# Patient Record
Sex: FEMALE | Race: WHITE | NOT HISPANIC OR LATINO | Employment: OTHER | URBAN - METROPOLITAN AREA
[De-identification: names, ages, dates, MRNs, and addresses within clinical notes are randomized per-mention and may not be internally consistent; named-entity substitution may affect disease eponyms.]

---

## 2023-05-25 ENCOUNTER — MEDICAL CORRESPONDENCE (OUTPATIENT)
Dept: HEALTH INFORMATION MANAGEMENT | Facility: CLINIC | Age: 68
End: 2023-05-25

## 2023-06-07 ENCOUNTER — LAB REQUISITION (OUTPATIENT)
Dept: LAB | Facility: CLINIC | Age: 68
End: 2023-06-07
Payer: COMMERCIAL

## 2023-06-07 DIAGNOSIS — M25.462 EFFUSION, LEFT KNEE: ICD-10-CM

## 2023-06-07 LAB
% LINING CELLS, BODY FLUID: 5 %
APPEARANCE FLD: ABNORMAL
CELL COUNT BODY FLUID SOURCE: ABNORMAL
COLOR FLD: ABNORMAL
CRYSTALS SNV MICRO: NORMAL
LYMPHOCYTES NFR FLD MANUAL: 28 %
MONOS+MACROS NFR FLD MANUAL: 23 %
NEUTS BAND NFR FLD MANUAL: 44 %
WBC # FLD AUTO: 2161 /UL

## 2023-06-07 PROCEDURE — 89051 BODY FLUID CELL COUNT: CPT | Mod: ORL | Performed by: ORTHOPAEDIC SURGERY

## 2023-06-07 PROCEDURE — 89060 EXAM SYNOVIAL FLUID CRYSTALS: CPT | Mod: ORL | Performed by: ORTHOPAEDIC SURGERY

## 2023-06-07 PROCEDURE — 87075 CULTR BACTERIA EXCEPT BLOOD: CPT | Mod: ORL | Performed by: ORTHOPAEDIC SURGERY

## 2023-06-07 PROCEDURE — 87070 CULTURE OTHR SPECIMN AEROBIC: CPT | Mod: ORL | Performed by: ORTHOPAEDIC SURGERY

## 2023-06-12 LAB — BACTERIA SNV CULT: NO GROWTH

## 2023-06-21 LAB — BACTERIA SNV CULT: NORMAL

## 2023-07-13 NOTE — BRIEF OP NOTE
Pre-op Total Joint Patient Screening    1. Do you have a ride available to come to the hospital the day after your surgery by 8am with anticipated discharge of 11am? STAYING 3 NIGHTS PER PT   2. What is the name of this person?   3. Do you have a  set up after surgery? TCU  4. Will your  be the same person that gives you a ride home after surgery?   5. Have you received the Joint Replacement Guidebook?YES   6. Do you have any questions about your guidebook?   7. Have you signed up for Epic Care Companion?   8. Have you signed up for MY Chart access?

## 2023-07-14 RX ORDER — BRIMONIDINE TARTRATE 0.25 MG/ML
1 SOLUTION/ DROPS OPHTHALMIC EVERY MORNING
COMMUNITY

## 2023-07-14 RX ORDER — FAMOTIDINE 10 MG
10 TABLET ORAL DAILY PRN
COMMUNITY

## 2023-07-14 RX ORDER — ZOLPIDEM TARTRATE 5 MG/1
5 TABLET ORAL
Status: ON HOLD | COMMUNITY
End: 2023-07-17

## 2023-07-14 RX ORDER — ACETAMINOPHEN 500 MG
500-1000 TABLET ORAL 2 TIMES DAILY PRN
Status: ON HOLD | COMMUNITY
End: 2023-07-19

## 2023-07-14 RX ORDER — DICLOFENAC SODIUM 75 MG/1
75 TABLET, DELAYED RELEASE ORAL 2 TIMES DAILY
COMMUNITY

## 2023-07-14 NOTE — PROGRESS NOTES
PTA medications updated by Medication Scribe prior to surgery via phone call with patient (last doses completed by Nurse)     Medication history sources: Patient, H&P and Patient's home med list  In the past week, patient estimated taking medication this percent of the time: Greater than 90%      Significant changes made to the medication list:  None      Additional medication history information:   None    Medication reconciliation completed by provider prior to medication history? No    Time spent in this activity: 40 MINUTES    The information provided in this note is only as accurate as the sources available at the time of update(s)      Prior to Admission medications    Medication Sig Last Dose Taking? Auth Provider Long Term End Date   acetaminophen (TYLENOL) 500 MG tablet Take 500-1,000 mg by mouth 2 times daily as needed for mild pain  at PRN Yes Reported, Patient     Brimonidine Tartrate (LUMIFY) 0.025 % SOLN Place 1 drop into both eyes every morning  at AM Yes Reported, Patient     calcium carbonate (OS-HORTENSIA) 1500 (600 Ca) MG tablet Take 600 mg by mouth every morning  at AM Yes Reported, Patient     diclofenac (VOLTAREN) 75 MG EC tablet Take 75 mg by mouth 2 times daily  Yes Reported, Patient Yes    famotidine (PEPCID) 10 MG tablet Take 10 mg by mouth daily as needed  at PM Yes Reported, Patient     fluticasone (FLONASE) 50 MCG/ACT nasal spray Spray 2 sprays into both nostrils daily  at PM Yes Reported, Patient     loratadine-pseudoePHEDrine (CLARITIN-D 12-HOUR) 5-120 MG 12 hr tablet Take 1 tablet by mouth every morning  at AM Yes Reported, Patient     simvastatin (ZOCOR) 10 MG tablet Take 10 mg by mouth At Bedtime  Yes Reported, Patient Yes    Vitamin D3 (CHOLECALCIFEROL) 25 mcg (1000 units) tablet Take 2 tablets by mouth daily  at AM Yes Reported, Patient     zolpidem (AMBIEN) 5 MG tablet Take 5 mg by mouth nightly as needed for sleep  at PRN Yes Reported, Patient

## 2023-07-17 ENCOUNTER — ANESTHESIA EVENT (OUTPATIENT)
Dept: SURGERY | Facility: CLINIC | Age: 68
DRG: 468 | End: 2023-07-17
Payer: COMMERCIAL

## 2023-07-17 ENCOUNTER — APPOINTMENT (OUTPATIENT)
Dept: GENERAL RADIOLOGY | Facility: CLINIC | Age: 68
DRG: 468 | End: 2023-07-17
Attending: ORTHOPAEDIC SURGERY
Payer: COMMERCIAL

## 2023-07-17 ENCOUNTER — ANESTHESIA (OUTPATIENT)
Dept: SURGERY | Facility: CLINIC | Age: 68
DRG: 468 | End: 2023-07-17
Payer: COMMERCIAL

## 2023-07-17 ENCOUNTER — HOSPITAL ENCOUNTER (INPATIENT)
Facility: CLINIC | Age: 68
LOS: 2 days | Discharge: NURSING, REHAB OR LONG-TERM CARE | DRG: 468 | End: 2023-07-19
Attending: ORTHOPAEDIC SURGERY | Admitting: ORTHOPAEDIC SURGERY
Payer: COMMERCIAL

## 2023-07-17 ENCOUNTER — APPOINTMENT (OUTPATIENT)
Dept: PHYSICAL THERAPY | Facility: CLINIC | Age: 68
DRG: 468 | End: 2023-07-17
Attending: ORTHOPAEDIC SURGERY
Payer: COMMERCIAL

## 2023-07-17 DIAGNOSIS — Z96.652 STATUS POST REVISION OF TOTAL KNEE, LEFT: Primary | ICD-10-CM

## 2023-07-17 DIAGNOSIS — G47.00 INSOMNIA, UNSPECIFIED TYPE: ICD-10-CM

## 2023-07-17 DIAGNOSIS — Z96.652 S/P REVISION OF TOTAL KNEE, LEFT: ICD-10-CM

## 2023-07-17 LAB
ABO/RH(D): NORMAL
ANION GAP SERPL CALCULATED.3IONS-SCNC: 13 MMOL/L (ref 7–15)
ANTIBODY SCREEN: NEGATIVE
BUN SERPL-MCNC: 14.5 MG/DL (ref 8–23)
CALCIUM SERPL-MCNC: 8.7 MG/DL (ref 8.8–10.2)
CHLORIDE SERPL-SCNC: 100 MMOL/L (ref 98–107)
CREAT SERPL-MCNC: 0.68 MG/DL (ref 0.51–0.95)
DEPRECATED HCO3 PLAS-SCNC: 23 MMOL/L (ref 22–29)
GFR SERPL CREATININE-BSD FRML MDRD: >90 ML/MIN/1.73M2
GLUCOSE SERPL-MCNC: 149 MG/DL (ref 70–99)
GRAM STAIN RESULT: NORMAL
GRAM STAIN RESULT: NORMAL
POTASSIUM SERPL-SCNC: 3.5 MMOL/L (ref 3.4–5.3)
SODIUM SERPL-SCNC: 136 MMOL/L (ref 136–145)
SPECIMEN EXPIRATION DATE: NORMAL

## 2023-07-17 PROCEDURE — 250N000011 HC RX IP 250 OP 636: Performed by: ORTHOPAEDIC SURGERY

## 2023-07-17 PROCEDURE — 272N000001 HC OR GENERAL SUPPLY STERILE: Performed by: ORTHOPAEDIC SURGERY

## 2023-07-17 PROCEDURE — 87077 CULTURE AEROBIC IDENTIFY: CPT | Performed by: ORTHOPAEDIC SURGERY

## 2023-07-17 PROCEDURE — 99207 PR NO BILLABLE SERVICE THIS VISIT: CPT | Performed by: PHYSICIAN ASSISTANT

## 2023-07-17 PROCEDURE — 258N000003 HC RX IP 258 OP 636: Performed by: ANESTHESIOLOGY

## 2023-07-17 PROCEDURE — 258N000003 HC RX IP 258 OP 636: Performed by: ORTHOPAEDIC SURGERY

## 2023-07-17 PROCEDURE — 87075 CULTR BACTERIA EXCEPT BLOOD: CPT | Performed by: ORTHOPAEDIC SURGERY

## 2023-07-17 PROCEDURE — 250N000013 HC RX MED GY IP 250 OP 250 PS 637: Performed by: ORTHOPAEDIC SURGERY

## 2023-07-17 PROCEDURE — 0SPD0JZ REMOVAL OF SYNTHETIC SUBSTITUTE FROM LEFT KNEE JOINT, OPEN APPROACH: ICD-10-PCS | Performed by: ORTHOPAEDIC SURGERY

## 2023-07-17 PROCEDURE — 250N000025 HC SEVOFLURANE, PER MIN: Performed by: ORTHOPAEDIC SURGERY

## 2023-07-17 PROCEDURE — 97530 THERAPEUTIC ACTIVITIES: CPT | Mod: GP

## 2023-07-17 PROCEDURE — 370N000017 HC ANESTHESIA TECHNICAL FEE, PER MIN: Performed by: ORTHOPAEDIC SURGERY

## 2023-07-17 PROCEDURE — 999N000063 XR KNEE PORT LEFT 1/2 VIEWS: Mod: LT

## 2023-07-17 PROCEDURE — 97110 THERAPEUTIC EXERCISES: CPT | Mod: GP

## 2023-07-17 PROCEDURE — 250N000011 HC RX IP 250 OP 636

## 2023-07-17 PROCEDURE — 120N000001 HC R&B MED SURG/OB

## 2023-07-17 PROCEDURE — 97161 PT EVAL LOW COMPLEX 20 MIN: CPT | Mod: GP

## 2023-07-17 PROCEDURE — 80048 BASIC METABOLIC PNL TOTAL CA: CPT | Performed by: ORTHOPAEDIC SURGERY

## 2023-07-17 PROCEDURE — 999N000141 HC STATISTIC PRE-PROCEDURE NURSING ASSESSMENT: Performed by: ORTHOPAEDIC SURGERY

## 2023-07-17 PROCEDURE — C1713 ANCHOR/SCREW BN/BN,TIS/BN: HCPCS | Performed by: ORTHOPAEDIC SURGERY

## 2023-07-17 PROCEDURE — 360N000078 HC SURGERY LEVEL 5, PER MIN: Performed by: ORTHOPAEDIC SURGERY

## 2023-07-17 PROCEDURE — 710N000009 HC RECOVERY PHASE 1, LEVEL 1, PER MIN: Performed by: ORTHOPAEDIC SURGERY

## 2023-07-17 PROCEDURE — C1776 JOINT DEVICE (IMPLANTABLE): HCPCS | Performed by: ORTHOPAEDIC SURGERY

## 2023-07-17 PROCEDURE — 250N000011 HC RX IP 250 OP 636: Mod: JZ | Performed by: ORTHOPAEDIC SURGERY

## 2023-07-17 PROCEDURE — 250N000009 HC RX 250: Performed by: ANESTHESIOLOGY

## 2023-07-17 PROCEDURE — 87205 SMEAR GRAM STAIN: CPT | Performed by: ORTHOPAEDIC SURGERY

## 2023-07-17 PROCEDURE — 0SRD0J9 REPLACEMENT OF LEFT KNEE JOINT WITH SYNTHETIC SUBSTITUTE, CEMENTED, OPEN APPROACH: ICD-10-PCS | Performed by: ORTHOPAEDIC SURGERY

## 2023-07-17 PROCEDURE — 36415 COLL VENOUS BLD VENIPUNCTURE: CPT | Performed by: ORTHOPAEDIC SURGERY

## 2023-07-17 PROCEDURE — 250N000011 HC RX IP 250 OP 636: Mod: JZ | Performed by: ANESTHESIOLOGY

## 2023-07-17 PROCEDURE — 250N000011 HC RX IP 250 OP 636: Performed by: ANESTHESIOLOGY

## 2023-07-17 PROCEDURE — 86901 BLOOD TYPING SEROLOGIC RH(D): CPT | Performed by: ORTHOPAEDIC SURGERY

## 2023-07-17 DEVICE — ATTUNE KNEE SYSTEM REVISION CEMENTED STEM CEMENTED 14X80MM
Type: IMPLANTABLE DEVICE | Site: KNEE | Status: FUNCTIONAL
Brand: ATTUNE

## 2023-07-17 DEVICE — ATTUNE KNEE SYSTEM REVISION ROTATING PLATFORM TIBIAL BASE CEMENTED SIZE 4
Type: IMPLANTABLE DEVICE | Site: KNEE | Status: FUNCTIONAL
Brand: ATTUNE

## 2023-07-17 DEVICE — IMP BONE CEMENT SIMPLEX W/GENTAMICIN 6195-1-001: Type: IMPLANTABLE DEVICE | Site: KNEE | Status: FUNCTIONAL

## 2023-07-17 RX ORDER — OXYCODONE HYDROCHLORIDE 5 MG/1
10 TABLET ORAL EVERY 4 HOURS PRN
Status: DISCONTINUED | OUTPATIENT
Start: 2023-07-17 | End: 2023-07-19 | Stop reason: HOSPADM

## 2023-07-17 RX ORDER — DEXAMETHASONE SODIUM PHOSPHATE 4 MG/ML
INJECTION, SOLUTION INTRA-ARTICULAR; INTRALESIONAL; INTRAMUSCULAR; INTRAVENOUS; SOFT TISSUE PRN
Status: DISCONTINUED | OUTPATIENT
Start: 2023-07-17 | End: 2023-07-17

## 2023-07-17 RX ORDER — EPHEDRINE SULFATE 50 MG/ML
INJECTION, SOLUTION INTRAMUSCULAR; INTRAVENOUS; SUBCUTANEOUS PRN
Status: DISCONTINUED | OUTPATIENT
Start: 2023-07-17 | End: 2023-07-17

## 2023-07-17 RX ORDER — ZOLPIDEM TARTRATE 5 MG/1
5 TABLET ORAL
Status: DISCONTINUED | OUTPATIENT
Start: 2023-07-18 | End: 2023-07-19 | Stop reason: HOSPADM

## 2023-07-17 RX ORDER — CEFAZOLIN SODIUM 2 G/100ML
2 INJECTION, SOLUTION INTRAVENOUS EVERY 8 HOURS
Status: COMPLETED | OUTPATIENT
Start: 2023-07-17 | End: 2023-07-18

## 2023-07-17 RX ORDER — PROPOFOL 10 MG/ML
INJECTION, EMULSION INTRAVENOUS CONTINUOUS PRN
Status: DISCONTINUED | OUTPATIENT
Start: 2023-07-17 | End: 2023-07-17

## 2023-07-17 RX ORDER — VANCOMYCIN HYDROCHLORIDE 1 G/20ML
INJECTION, POWDER, LYOPHILIZED, FOR SOLUTION INTRAVENOUS PRN
Status: DISCONTINUED | OUTPATIENT
Start: 2023-07-17 | End: 2023-07-17 | Stop reason: HOSPADM

## 2023-07-17 RX ORDER — FENTANYL CITRATE 50 UG/ML
25 INJECTION, SOLUTION INTRAMUSCULAR; INTRAVENOUS EVERY 5 MIN PRN
Status: DISCONTINUED | OUTPATIENT
Start: 2023-07-17 | End: 2023-07-17 | Stop reason: HOSPADM

## 2023-07-17 RX ORDER — VASOPRESSIN IN 0.9 % NACL 2 UNIT/2ML
SYRINGE (ML) INTRAVENOUS PRN
Status: DISCONTINUED | OUTPATIENT
Start: 2023-07-17 | End: 2023-07-17

## 2023-07-17 RX ORDER — NALOXONE HYDROCHLORIDE 0.4 MG/ML
0.2 INJECTION, SOLUTION INTRAMUSCULAR; INTRAVENOUS; SUBCUTANEOUS
Status: DISCONTINUED | OUTPATIENT
Start: 2023-07-17 | End: 2023-07-19 | Stop reason: HOSPADM

## 2023-07-17 RX ORDER — PROCHLORPERAZINE MALEATE 5 MG
5 TABLET ORAL EVERY 6 HOURS PRN
Status: DISCONTINUED | OUTPATIENT
Start: 2023-07-17 | End: 2023-07-19 | Stop reason: HOSPADM

## 2023-07-17 RX ORDER — FENTANYL CITRATE 50 UG/ML
50 INJECTION, SOLUTION INTRAMUSCULAR; INTRAVENOUS EVERY 5 MIN PRN
Status: DISCONTINUED | OUTPATIENT
Start: 2023-07-17 | End: 2023-07-17 | Stop reason: HOSPADM

## 2023-07-17 RX ORDER — SODIUM CHLORIDE, SODIUM LACTATE, POTASSIUM CHLORIDE, CALCIUM CHLORIDE 600; 310; 30; 20 MG/100ML; MG/100ML; MG/100ML; MG/100ML
INJECTION, SOLUTION INTRAVENOUS CONTINUOUS
Status: DISCONTINUED | OUTPATIENT
Start: 2023-07-17 | End: 2023-07-17

## 2023-07-17 RX ORDER — CEFAZOLIN SODIUM/WATER 2 G/20 ML
2 SYRINGE (ML) INTRAVENOUS
Status: COMPLETED | OUTPATIENT
Start: 2023-07-17 | End: 2023-07-17

## 2023-07-17 RX ORDER — LIDOCAINE HYDROCHLORIDE 20 MG/ML
INJECTION, SOLUTION INFILTRATION; PERINEURAL PRN
Status: DISCONTINUED | OUTPATIENT
Start: 2023-07-17 | End: 2023-07-17

## 2023-07-17 RX ORDER — LIDOCAINE 40 MG/G
CREAM TOPICAL
Status: DISCONTINUED | OUTPATIENT
Start: 2023-07-17 | End: 2023-07-19 | Stop reason: HOSPADM

## 2023-07-17 RX ORDER — POLYETHYLENE GLYCOL 3350 17 G/17G
17 POWDER, FOR SOLUTION ORAL DAILY
Status: DISCONTINUED | OUTPATIENT
Start: 2023-07-18 | End: 2023-07-19 | Stop reason: HOSPADM

## 2023-07-17 RX ORDER — SIMVASTATIN 10 MG
10 TABLET ORAL AT BEDTIME
Status: DISCONTINUED | OUTPATIENT
Start: 2023-07-17 | End: 2023-07-19 | Stop reason: HOSPADM

## 2023-07-17 RX ORDER — LIDOCAINE 40 MG/G
CREAM TOPICAL
Status: DISCONTINUED | OUTPATIENT
Start: 2023-07-17 | End: 2023-07-18

## 2023-07-17 RX ORDER — NEOSTIGMINE METHYLSULFATE 1 MG/ML
VIAL (ML) INJECTION PRN
Status: DISCONTINUED | OUTPATIENT
Start: 2023-07-17 | End: 2023-07-17

## 2023-07-17 RX ORDER — ZOLPIDEM TARTRATE 5 MG/1
5 TABLET ORAL
Status: DISCONTINUED | OUTPATIENT
Start: 2023-07-17 | End: 2023-07-17

## 2023-07-17 RX ORDER — ZOLPIDEM TARTRATE 5 MG/1
5 TABLET ORAL
Status: SHIPPED | DISCHARGE
Start: 2023-07-17

## 2023-07-17 RX ORDER — HYDROMORPHONE HCL IN WATER/PF 6 MG/30 ML
0.4 PATIENT CONTROLLED ANALGESIA SYRINGE INTRAVENOUS EVERY 5 MIN PRN
Status: DISCONTINUED | OUTPATIENT
Start: 2023-07-17 | End: 2023-07-17 | Stop reason: HOSPADM

## 2023-07-17 RX ORDER — PROPOFOL 10 MG/ML
INJECTION, EMULSION INTRAVENOUS PRN
Status: DISCONTINUED | OUTPATIENT
Start: 2023-07-17 | End: 2023-07-17

## 2023-07-17 RX ORDER — ONDANSETRON 4 MG/1
4 TABLET, ORALLY DISINTEGRATING ORAL EVERY 6 HOURS PRN
Status: DISCONTINUED | OUTPATIENT
Start: 2023-07-17 | End: 2023-07-19 | Stop reason: HOSPADM

## 2023-07-17 RX ORDER — HYDROMORPHONE HCL IN WATER/PF 6 MG/30 ML
0.2 PATIENT CONTROLLED ANALGESIA SYRINGE INTRAVENOUS EVERY 5 MIN PRN
Status: DISCONTINUED | OUTPATIENT
Start: 2023-07-17 | End: 2023-07-17 | Stop reason: HOSPADM

## 2023-07-17 RX ORDER — HYDROXYZINE HYDROCHLORIDE 10 MG/1
10 TABLET, FILM COATED ORAL EVERY 6 HOURS PRN
Status: DISCONTINUED | OUTPATIENT
Start: 2023-07-17 | End: 2023-07-19 | Stop reason: HOSPADM

## 2023-07-17 RX ORDER — SODIUM CHLORIDE, SODIUM LACTATE, POTASSIUM CHLORIDE, CALCIUM CHLORIDE 600; 310; 30; 20 MG/100ML; MG/100ML; MG/100ML; MG/100ML
INJECTION, SOLUTION INTRAVENOUS CONTINUOUS
Status: DISCONTINUED | OUTPATIENT
Start: 2023-07-17 | End: 2023-07-17 | Stop reason: HOSPADM

## 2023-07-17 RX ORDER — TRANEXAMIC ACID 650 MG/1
1950 TABLET ORAL ONCE
Status: COMPLETED | OUTPATIENT
Start: 2023-07-17 | End: 2023-07-17

## 2023-07-17 RX ORDER — FENTANYL CITRATE 50 UG/ML
INJECTION, SOLUTION INTRAMUSCULAR; INTRAVENOUS PRN
Status: DISCONTINUED | OUTPATIENT
Start: 2023-07-17 | End: 2023-07-17

## 2023-07-17 RX ORDER — CEFAZOLIN SODIUM/WATER 2 G/20 ML
2 SYRINGE (ML) INTRAVENOUS SEE ADMIN INSTRUCTIONS
Status: DISCONTINUED | OUTPATIENT
Start: 2023-07-17 | End: 2023-07-17

## 2023-07-17 RX ORDER — CHOLECALCIFEROL (VITAMIN D3) 50 MCG
50 TABLET ORAL DAILY
Status: DISCONTINUED | OUTPATIENT
Start: 2023-07-18 | End: 2023-07-19 | Stop reason: HOSPADM

## 2023-07-17 RX ORDER — ONDANSETRON 2 MG/ML
4 INJECTION INTRAMUSCULAR; INTRAVENOUS EVERY 30 MIN PRN
Status: DISCONTINUED | OUTPATIENT
Start: 2023-07-17 | End: 2023-07-17 | Stop reason: HOSPADM

## 2023-07-17 RX ORDER — ONDANSETRON 2 MG/ML
INJECTION INTRAMUSCULAR; INTRAVENOUS PRN
Status: DISCONTINUED | OUTPATIENT
Start: 2023-07-17 | End: 2023-07-17

## 2023-07-17 RX ORDER — NALOXONE HYDROCHLORIDE 0.4 MG/ML
0.4 INJECTION, SOLUTION INTRAMUSCULAR; INTRAVENOUS; SUBCUTANEOUS
Status: DISCONTINUED | OUTPATIENT
Start: 2023-07-17 | End: 2023-07-19 | Stop reason: HOSPADM

## 2023-07-17 RX ORDER — SODIUM CHLORIDE 9 MG/ML
INJECTION, SOLUTION INTRAVENOUS CONTINUOUS
Status: DISCONTINUED | OUTPATIENT
Start: 2023-07-17 | End: 2023-07-19 | Stop reason: HOSPADM

## 2023-07-17 RX ORDER — ONDANSETRON 2 MG/ML
4 INJECTION INTRAMUSCULAR; INTRAVENOUS EVERY 6 HOURS PRN
Status: DISCONTINUED | OUTPATIENT
Start: 2023-07-17 | End: 2023-07-19 | Stop reason: HOSPADM

## 2023-07-17 RX ORDER — ASPIRIN 81 MG/1
81 TABLET ORAL 2 TIMES DAILY
Status: DISCONTINUED | OUTPATIENT
Start: 2023-07-17 | End: 2023-07-19 | Stop reason: HOSPADM

## 2023-07-17 RX ORDER — GLYCOPYRROLATE 0.2 MG/ML
INJECTION, SOLUTION INTRAMUSCULAR; INTRAVENOUS PRN
Status: DISCONTINUED | OUTPATIENT
Start: 2023-07-17 | End: 2023-07-17

## 2023-07-17 RX ORDER — HYDROMORPHONE HCL IN WATER/PF 6 MG/30 ML
0.2 PATIENT CONTROLLED ANALGESIA SYRINGE INTRAVENOUS
Status: DISCONTINUED | OUTPATIENT
Start: 2023-07-17 | End: 2023-07-19 | Stop reason: HOSPADM

## 2023-07-17 RX ORDER — ONDANSETRON 4 MG/1
4 TABLET, ORALLY DISINTEGRATING ORAL EVERY 30 MIN PRN
Status: DISCONTINUED | OUTPATIENT
Start: 2023-07-17 | End: 2023-07-17 | Stop reason: HOSPADM

## 2023-07-17 RX ORDER — FAMOTIDINE 10 MG
10 TABLET ORAL DAILY
Status: DISCONTINUED | OUTPATIENT
Start: 2023-07-17 | End: 2023-07-17

## 2023-07-17 RX ORDER — ACETAMINOPHEN 325 MG/1
650 TABLET ORAL EVERY 4 HOURS PRN
Status: DISCONTINUED | OUTPATIENT
Start: 2023-07-20 | End: 2023-07-19 | Stop reason: HOSPADM

## 2023-07-17 RX ORDER — ACETAMINOPHEN 325 MG/1
975 TABLET ORAL EVERY 8 HOURS
Status: DISCONTINUED | OUTPATIENT
Start: 2023-07-17 | End: 2023-07-19 | Stop reason: HOSPADM

## 2023-07-17 RX ORDER — BISACODYL 10 MG
10 SUPPOSITORY, RECTAL RECTAL DAILY PRN
Status: DISCONTINUED | OUTPATIENT
Start: 2023-07-17 | End: 2023-07-19 | Stop reason: HOSPADM

## 2023-07-17 RX ORDER — FLUTICASONE PROPIONATE 50 MCG
2 SPRAY, SUSPENSION (ML) NASAL DAILY
Status: DISCONTINUED | OUTPATIENT
Start: 2023-07-17 | End: 2023-07-19 | Stop reason: HOSPADM

## 2023-07-17 RX ORDER — AMOXICILLIN 250 MG
1 CAPSULE ORAL 2 TIMES DAILY
Status: DISCONTINUED | OUTPATIENT
Start: 2023-07-17 | End: 2023-07-19 | Stop reason: HOSPADM

## 2023-07-17 RX ORDER — HYDROMORPHONE HCL IN WATER/PF 6 MG/30 ML
0.4 PATIENT CONTROLLED ANALGESIA SYRINGE INTRAVENOUS
Status: DISCONTINUED | OUTPATIENT
Start: 2023-07-17 | End: 2023-07-19 | Stop reason: HOSPADM

## 2023-07-17 RX ORDER — ACETAMINOPHEN 500 MG
500-1000 TABLET ORAL 2 TIMES DAILY PRN
Status: DISCONTINUED | OUTPATIENT
Start: 2023-07-17 | End: 2023-07-17

## 2023-07-17 RX ORDER — OXYCODONE HYDROCHLORIDE 5 MG/1
5 TABLET ORAL EVERY 4 HOURS PRN
Status: DISCONTINUED | OUTPATIENT
Start: 2023-07-17 | End: 2023-07-19 | Stop reason: HOSPADM

## 2023-07-17 RX ORDER — FAMOTIDINE 20 MG/1
20 TABLET, FILM COATED ORAL 2 TIMES DAILY
Status: DISCONTINUED | OUTPATIENT
Start: 2023-07-17 | End: 2023-07-19 | Stop reason: HOSPADM

## 2023-07-17 RX ADMIN — NEOSTIGMINE METHYLSULFATE 4 MG: 1 INJECTION, SOLUTION INTRAVENOUS at 12:38

## 2023-07-17 RX ADMIN — FENTANYL CITRATE 50 MCG: 50 INJECTION, SOLUTION INTRAMUSCULAR; INTRAVENOUS at 14:16

## 2023-07-17 RX ADMIN — LIDOCAINE HYDROCHLORIDE 80 MG: 20 INJECTION, SOLUTION INFILTRATION; PERINEURAL at 10:32

## 2023-07-17 RX ADMIN — Medication 10 MG: at 10:39

## 2023-07-17 RX ADMIN — FAMOTIDINE 20 MG: 20 TABLET ORAL at 21:04

## 2023-07-17 RX ADMIN — MIDAZOLAM 1 MG: 1 INJECTION INTRAMUSCULAR; INTRAVENOUS at 10:22

## 2023-07-17 RX ADMIN — PHENYLEPHRINE HYDROCHLORIDE 100 MCG: 10 INJECTION INTRAVENOUS at 10:34

## 2023-07-17 RX ADMIN — SODIUM CHLORIDE, POTASSIUM CHLORIDE, SODIUM LACTATE AND CALCIUM CHLORIDE: 600; 310; 30; 20 INJECTION, SOLUTION INTRAVENOUS at 08:21

## 2023-07-17 RX ADMIN — FLUTICASONE PROPIONATE 2 SPRAY: 50 SPRAY, METERED NASAL at 21:06

## 2023-07-17 RX ADMIN — Medication 0.5 UNITS: at 10:43

## 2023-07-17 RX ADMIN — SODIUM CHLORIDE: 9 INJECTION, SOLUTION INTRAVENOUS at 16:41

## 2023-07-17 RX ADMIN — DEXAMETHASONE SODIUM PHOSPHATE 10 MG: 4 INJECTION, SOLUTION INTRA-ARTICULAR; INTRALESIONAL; INTRAMUSCULAR; INTRAVENOUS; SOFT TISSUE at 10:46

## 2023-07-17 RX ADMIN — Medication 15 ML: at 08:47

## 2023-07-17 RX ADMIN — MIDAZOLAM 1 MG: 1 INJECTION INTRAMUSCULAR; INTRAVENOUS at 08:43

## 2023-07-17 RX ADMIN — PROPOFOL 160 MG: 10 INJECTION, EMULSION INTRAVENOUS at 10:32

## 2023-07-17 RX ADMIN — OXYCODONE HYDROCHLORIDE 5 MG: 5 TABLET ORAL at 16:21

## 2023-07-17 RX ADMIN — Medication 2 G: at 10:31

## 2023-07-17 RX ADMIN — DEXMEDETOMIDINE HYDROCHLORIDE 10 MCG: 100 INJECTION, SOLUTION INTRAVENOUS at 12:24

## 2023-07-17 RX ADMIN — ACETAMINOPHEN 975 MG: 325 TABLET, FILM COATED ORAL at 16:21

## 2023-07-17 RX ADMIN — SENNOSIDES AND DOCUSATE SODIUM 1 TABLET: 50; 8.6 TABLET ORAL at 22:47

## 2023-07-17 RX ADMIN — GLYCOPYRROLATE 0.2 MG: 0.2 INJECTION, SOLUTION INTRAMUSCULAR; INTRAVENOUS at 10:47

## 2023-07-17 RX ADMIN — SIMVASTATIN 10 MG: 10 TABLET, FILM COATED ORAL at 21:04

## 2023-07-17 RX ADMIN — TRANEXAMIC ACID 1950 MG: 650 TABLET ORAL at 08:19

## 2023-07-17 RX ADMIN — FENTANYL CITRATE 100 MCG: 50 INJECTION, SOLUTION INTRAMUSCULAR; INTRAVENOUS at 10:32

## 2023-07-17 RX ADMIN — Medication 10 MG: at 10:42

## 2023-07-17 RX ADMIN — PROPOFOL 30 MCG/KG/MIN: 10 INJECTION, EMULSION INTRAVENOUS at 10:51

## 2023-07-17 RX ADMIN — SODIUM CHLORIDE, POTASSIUM CHLORIDE, SODIUM LACTATE AND CALCIUM CHLORIDE: 600; 310; 30; 20 INJECTION, SOLUTION INTRAVENOUS at 11:32

## 2023-07-17 RX ADMIN — MIDAZOLAM 1 MG: 1 INJECTION INTRAMUSCULAR; INTRAVENOUS at 08:38

## 2023-07-17 RX ADMIN — ASPIRIN 81 MG: 81 TABLET, COATED ORAL at 21:04

## 2023-07-17 RX ADMIN — ONDANSETRON 4 MG: 2 INJECTION INTRAMUSCULAR; INTRAVENOUS at 12:40

## 2023-07-17 RX ADMIN — PHENYLEPHRINE HYDROCHLORIDE 0.3 MCG/KG/MIN: 10 INJECTION INTRAVENOUS at 10:39

## 2023-07-17 RX ADMIN — OXYCODONE HYDROCHLORIDE 5 MG: 5 TABLET ORAL at 23:49

## 2023-07-17 RX ADMIN — DEXMEDETOMIDINE HYDROCHLORIDE 10 MCG: 100 INJECTION, SOLUTION INTRAVENOUS at 12:01

## 2023-07-17 RX ADMIN — CEFAZOLIN SODIUM 2 G: 2 INJECTION, SOLUTION INTRAVENOUS at 18:14

## 2023-07-17 RX ADMIN — Medication 5 MG: at 10:37

## 2023-07-17 RX ADMIN — ACETAMINOPHEN 975 MG: 325 TABLET, FILM COATED ORAL at 23:49

## 2023-07-17 RX ADMIN — ROCURONIUM BROMIDE 40 MG: 50 INJECTION, SOLUTION INTRAVENOUS at 10:32

## 2023-07-17 RX ADMIN — GLYCOPYRROLATE 0.6 MG: 0.2 INJECTION, SOLUTION INTRAMUSCULAR; INTRAVENOUS at 12:38

## 2023-07-17 ASSESSMENT — LIFESTYLE VARIABLES: TOBACCO_USE: 0

## 2023-07-17 ASSESSMENT — ACTIVITIES OF DAILY LIVING (ADL)
ADLS_ACUITY_SCORE: 20
ADLS_ACUITY_SCORE: 24
ADLS_ACUITY_SCORE: 35
ADLS_ACUITY_SCORE: 20

## 2023-07-17 NOTE — ANESTHESIA PROCEDURE NOTES
Airway       Patient location during procedure: OR       Procedure Start/Stop Times: 7/17/2023 10:34 AM  Staff -        Anesthesiologist:  Francisco Suarez MD       CRNA: Jen Castro APRN CRNA       Other Anesthesia Staff: Amanda Villegas       Performed By: YODIT and with CRNAs       Procedure performed by resident/fellow/CRNA in presence of a teaching physician.  Indications and Patient Condition       Indications for airway management: aj-procedural and airway protection       Induction type:intravenous       Mask difficulty assessment: 1 - vent by mask    Final Airway Details       Final airway type: endotracheal airway       Successful airway: ETT - single  Endotracheal Airway Details        ETT size (mm): 7.0       Cuffed: yes       Successful intubation technique: direct laryngoscopy       DL Blade Type: Castro 2       Grade View of Cords: 1       Adjucts: stylet       Position: Right       Measured from: gums/teeth       Secured at (cm): 20       Bite block used: None    Post intubation assessment        Placement verified by: capnometry, equal breath sounds and chest rise        Number of attempts at approach: 1       Number of other approaches attempted: 0       Secured with: ties       Ease of procedure: easy       Dentition: Intact and Unchanged    Medication(s) Administered   Medication Administration Time: 7/17/2023 10:34 AM

## 2023-07-17 NOTE — ANESTHESIA PROCEDURE NOTES
Adductor canal and Femoral Procedure Note    Pre-Procedure   Staff -        Anesthesiologist:  Francisco Suarez MD       Performed By: anesthesiologist       Location: pre-op       Pre-Anesthestic Checklist: patient identified, IV checked, site marked, risks and benefits discussed, informed consent, monitors and equipment checked, pre-op evaluation, at physician/surgeon's request and post-op pain management  Timeout:       Correct Patient: Yes        Correct Procedure: Yes        Correct Site: Yes        Correct Position: Yes        Correct Laterality: Yes        Site Marked: Yes  Procedure Documentation  Procedure: Adductor canal, Femoral       Laterality: left       Patient Position: supine       Patient Prep/Sterile Barriers: sterile gloves, mask, patient draped       Skin prep: Chloraprep       Local skin infiltrated with 2 mL of 1% lidocaine.        Needle Type: insulated       Ultrasound guided       1. Ultrasound was used to identify targeted nerve, plexus, vascular marker, or fascial plane and place a needle adjacent to it in real-time.       2. Ultrasound was used to visualize the spread of anesthetic in close proximity to the above referenced structure.       3. A permanent image is entered into the patient's record.       4. The visualized anatomic structures appeared normal.       5. There were no apparent abnormal pathologic findings.    Assessment/Narrative         The placement was negative for: blood aspirated, painful injection and site bleeding       Paresthesias: No.       Bolus given via needle..        Secured via.        Insertion/Infusion Method: Single Shot       Complications: none       Injection made incrementally with aspirations every 5 mL.    Medication(s) Administered   Ropivacaine 0.5% w/ 1:400K Epi (Injection) - Injection   15 mL - 7/17/2023 8:47:00 AM   Comments:  Patient tolerated the procedure well.    The surgeon has given a verbal order transferring care of this patient to me  "for the performance of a regional analgesia block for post-op pain control. It is requested of me because I am uniquely trained and qualified to perform this block and the surgeon is neither trained nor qualified to perform this procedure.Ultrasound Interpretation, peripheral nerve block.        FOR [facility] ([facility]) ONLY:   Pain Team Contact information: please page the Pain Team Via LegalJ[employer]. Search \"Pain\". During daytime hours, please page the attending first. At night please page the resident first.      "

## 2023-07-17 NOTE — PROGRESS NOTES
Admitted from [place] at 1450. Patient is A&O x4. Vss on 2L nc. Rates pain 8/10. Dressing CDI. Hemo vac unclamped. IV infusing. Resting comfortably with son at bedside.

## 2023-07-17 NOTE — ANESTHESIA POSTPROCEDURE EVALUATION
Patient: Lary Gonzalez    Procedure: Procedure(s):  Total Left Knee Revision       Anesthesia Type:  General    Note:  Disposition: Admission   Postop Pain Control: Uneventful            Sign Out: Well controlled pain   PONV: No   Neuro/Psych: Uneventful            Sign Out: Acceptable/Baseline neuro status   Airway/Respiratory: Uneventful            Sign Out: Acceptable/Baseline resp. status   CV/Hemodynamics: Uneventful            Sign Out: Acceptable CV status   Other NRE: NONE   DID A NON-ROUTINE EVENT OCCUR? No           Last vitals:  Vitals Value Taken Time   /75 07/17/23 1430   Temp 36.4  C (97.5  F) 07/17/23 1445   Pulse 87 07/17/23 1445   Resp 26 07/17/23 1445   SpO2 91 % 07/17/23 1445       Electronically Signed By: Francisco Suarez MD  July 17, 2023  3:40 PM

## 2023-07-17 NOTE — ANESTHESIA CARE TRANSFER NOTE
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    Anesthesia Type:   General     Note:    Oropharynx: oropharynx clear of all foreign objects and spontaneously breathing  Level of Consciousness: drowsy  Oxygen Supplementation: face mask  Level of Supplemental Oxygen (L/min / FiO2): 6  Independent Airway: airway patency satisfactory and stable  Dentition: dentition unchanged  Vital Signs Stable: post-procedure vital signs reviewed and stable  Report to RN Given: handoff report given  Patient transferred to: PACU    Handoff Report: Identifed the Patient, Identified the Reponsible Provider, Reviewed the pertinent medical history, Discussed the surgical course, Reviewed Intra-OP anesthesia mangement and issues during anesthesia, Set expectations for post-procedure period and Allowed opportunity for questions and acknowledgement of understanding      Vitals:  Vitals Value Taken Time   /68 07/17/23 1318   Temp     Pulse 83 07/17/23 1322   Resp 21 07/17/23 1322   SpO2 93 % 07/17/23 1322   Vitals shown include unvalidated device data.    Electronically Signed By: JERRI Mcknight CRNA  July 17, 2023  1:22 PM

## 2023-07-17 NOTE — CONSULTS
"[facility]  Consult Note - Hospitalist Service      Date of Admission:  7/17/2023  Consult Requested by: Orthopedics  Reason for Consult: Medical comanagement and med rec    Assessment & Plan   Lary Gonzalez is a 68 year old female with PMH significant for GERD, allergic rhinitis, dyslipidemia, IBS, vitamin D deficiency, and osteopenia who was admitted to [facility] on 7/17/2023 by the orthopedics service for left total knee revision with Dr. Rony Kebede. No immediate postoperative complications, GETA, EBL <100mL. Preoperative H&P reviewed.    Vital signs:  Temp: 97.4  F (36.3  C) Temp src: Temporal BP: 111/64 Pulse: 87   Resp: 21 SpO2: 90 % O2 Device: Nasal cannula Oxygen Delivery: 2 LPM Height: 157.5 cm (5' 2\") Weight: 81.6 kg (180 lb)  Estimated body mass index is 32.92 kg/m  as calculated from the following:    Height as of this encounter: 1.575 m (5' 2\").    Weight as of this encounter: 81.6 kg (180 lb).    Diet: NPO per Anesthesia Guidelines for Procedure/Surgery Except for: Meds    DVT Prophylaxis: Defer to primary service.  Torres Catheter: Not present  Code Status:  Per primary service    Plan:  - Routine postoperative cares per primary service, including IVF, pain control, abx, DVT ppx, and disposition  - PT/OT as per primary service  - Aggressive pulmonary toilet, frequent IS use 10x/hour while awake  - Appropriate PTA medications resumed  - Discharge medication reconciliation completed  - I recommend avoiding Ambien while taking oxycodone/Percocet  - Defer formal consultation. Hospitalist will sign off.  - *Please reconsult if medical issues arise that require Hospitalist assistance    No charge note.    DARNELL Thrasher, PA-C  Hospitalist [facility]  [facility]  Securely message with the Vocera Web Console (learn more here)  Text page via [employer] Paging/Directory          "

## 2023-07-17 NOTE — ANESTHESIA PREPROCEDURE EVALUATION
Anesthesia Pre-Procedure Evaluation    Patient: Lary Gonzalez   MRN: [id number] : 1955        Procedure : Procedure(s):  Total Left Knee Revision          Past Medical History:   Diagnosis Date     Gastro-oesophageal reflux disease      Hyperlipidemia      Neuropathy of lower extremity     bilat feet     Osteopenia      Pulmonary nodule      Spondylolisthesis      Vitamin D deficiency       Past Surgical History:   Procedure Laterality Date     APPENDECTOMY       ENT SURGERY      tonsillectomy     GYN SURGERY      hysteroscopy     ORTHOPEDIC SURGERY Right     knee replacement     ORTHOPEDIC SURGERY Left     knee replacement     REPAIR TENDON FOOT Left 2015    Procedure: REPAIR TENDON FOOT;  Surgeon: Sincere Higginbotham MD;  Location: [facility] OR     VASCULAR SURGERY  2013    bilat GSVRFA and bilat avulsions      Allergies   Allergen Reactions     Adhesive Tape      Tears skin     Fenofibrate      Gabapentin      Benadryl [Diphenhydramine] Anxiety      Social History     Tobacco Use     Smoking status: Never     Smokeless tobacco: Never   Substance Use Topics     Alcohol use: Yes     Comment: 2 drinks/wk      Wt Readings from Last 1 Encounters:   02/26/15 87.3 kg (192 lb 8 oz)        Anesthesia Evaluation            ROS/MED HX  ENT/Pulmonary: Comment: Lung Nodule    (+) allergic rhinitis,  (-) tobacco use and sleep apnea   Neurologic:     (+) peripheral neuropathy, - B/L. progressive idiopathic neuropathy of feet and lower legs.     Cardiovascular:     (+) Dyslipidemia -----    METS/Exercise Tolerance:     Hematologic:       Musculoskeletal:   (+) arthritis,     GI/Hepatic: Comment: IBS    (+) GERD, Asymptomatic on medication,     Renal/Genitourinary:       Endo:       Psychiatric/Substance Use:       Infectious Disease:       Malignancy:       Other:            Physical Exam    Airway        Mallampati: II   TM distance: > 3 FB   Neck ROM: full   Mouth opening: > 3 cm    Respiratory Devices and  Support         Dental       (+) Minor Abnormalities - some fillings, tiny chips      Cardiovascular   cardiovascular exam normal          Pulmonary   pulmonary exam normal                OUTSIDE LABS:  CBC:   Lab Results   Component Value Date    WBC 6.7 07/21/2008    WBC 7.9 03/13/2008    HGB 11.1 (L) 09/26/2008    HGB 11.7 09/25/2008    HCT 39.6 07/21/2008    HCT 38.7 03/13/2008     07/21/2008     03/13/2008     BMP:   Lab Results   Component Value Date     09/25/2008     09/24/2008    POTASSIUM 4.0 09/25/2008    POTASSIUM 3.9 09/24/2008    CHLORIDE 103 09/25/2008    CHLORIDE 102 09/24/2008    CO2 29 09/25/2008    CO2 28 09/24/2008    BUN 17 09/24/2008    BUN 19 07/21/2008    CR 0.65 09/24/2008    CR 0.63 07/21/2008    GLC 84 09/24/2008    GLC 87 07/21/2008     COAGS:   Lab Results   Component Value Date    PTT 26 09/24/2008    INR 1.74 (H) 09/27/2008     POC: No results found for: BGM, HCG, HCGS  HEPATIC:   Lab Results   Component Value Date    PROTTOTAL 7.5 03/13/2008    ALT 18 03/13/2008    AST 37 03/13/2008    ALKPHOS 74 03/13/2008     OTHER:   Lab Results   Component Value Date    A1C 5.5 03/13/2008    HORTENSIA 9.7 09/24/2008    TSH 3.15 03/13/2008    SED 15 03/13/2008       Anesthesia Plan    ASA Status:  2   NPO Status:  NPO Appropriate    Anesthesia Type: General.     - Airway: ETT   Induction: Intravenous, Propofol.   Maintenance: Balanced.        Consents    Anesthesia Plan(s) and associated risks, benefits, and realistic alternatives discussed. Questions answered and patient/representative(s) expressed understanding.    - Discussed:     - Discussed with:  Patient         Postoperative Care    Pain management: Multi-modal analgesia.   PONV prophylaxis: Ondansetron (or other 5HT-3), Dexamethasone or Solumedrol, Background Propofol Infusion     Comments:                Francisco Suarez MD

## 2023-07-17 NOTE — BRIEF OP NOTE
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  Complications: None     .  Implants: * No implants in log *

## 2023-07-18 ENCOUNTER — APPOINTMENT (OUTPATIENT)
Dept: PHYSICAL THERAPY | Facility: CLINIC | Age: 68
DRG: 468 | End: 2023-07-18
Attending: ORTHOPAEDIC SURGERY
Payer: COMMERCIAL

## 2023-07-18 LAB
GLUCOSE BLDC GLUCOMTR-MCNC: 108 MG/DL (ref 70–99)
GLUCOSE BLDC GLUCOMTR-MCNC: 142 MG/DL (ref 70–99)
GLUCOSE BLDC GLUCOMTR-MCNC: 159 MG/DL (ref 70–99)
HGB BLD-MCNC: 11.4 G/DL (ref 11.7–15.7)

## 2023-07-18 PROCEDURE — 120N000001 HC R&B MED SURG/OB

## 2023-07-18 PROCEDURE — 97116 GAIT TRAINING THERAPY: CPT | Mod: GP | Performed by: PHYSICAL THERAPY ASSISTANT

## 2023-07-18 PROCEDURE — 97110 THERAPEUTIC EXERCISES: CPT | Mod: GP | Performed by: PHYSICAL THERAPY ASSISTANT

## 2023-07-18 PROCEDURE — 36415 COLL VENOUS BLD VENIPUNCTURE: CPT | Performed by: ORTHOPAEDIC SURGERY

## 2023-07-18 PROCEDURE — 97530 THERAPEUTIC ACTIVITIES: CPT | Mod: GP | Performed by: PHYSICAL THERAPY ASSISTANT

## 2023-07-18 PROCEDURE — 250N000013 HC RX MED GY IP 250 OP 250 PS 637: Performed by: ORTHOPAEDIC SURGERY

## 2023-07-18 PROCEDURE — 250N000011 HC RX IP 250 OP 636: Mod: JZ | Performed by: ORTHOPAEDIC SURGERY

## 2023-07-18 PROCEDURE — 85018 HEMOGLOBIN: CPT | Performed by: ORTHOPAEDIC SURGERY

## 2023-07-18 PROCEDURE — 999N000111 HC STATISTIC OT IP EVAL DEFER

## 2023-07-18 RX ORDER — OXYCODONE HYDROCHLORIDE 5 MG/1
5-10 TABLET ORAL EVERY 4 HOURS PRN
Qty: 30 TABLET | Refills: 0 | Status: SHIPPED | OUTPATIENT
Start: 2023-07-18

## 2023-07-18 RX ORDER — CEFAZOLIN SODIUM 1 G/3ML
1 INJECTION, POWDER, FOR SOLUTION INTRAMUSCULAR; INTRAVENOUS EVERY 8 HOURS
Status: COMPLETED | OUTPATIENT
Start: 2023-07-18 | End: 2023-07-19

## 2023-07-18 RX ORDER — OXYCODONE HYDROCHLORIDE 5 MG/1
5-10 TABLET ORAL EVERY 4 HOURS PRN
Qty: 30 TABLET | Refills: 0 | Status: SHIPPED | OUTPATIENT
Start: 2023-07-18 | End: 2023-07-18

## 2023-07-18 RX ADMIN — OXYCODONE HYDROCHLORIDE 5 MG: 5 TABLET ORAL at 08:18

## 2023-07-18 RX ADMIN — CEFAZOLIN 1 G: 1 INJECTION, POWDER, FOR SOLUTION INTRAMUSCULAR; INTRAVENOUS at 21:10

## 2023-07-18 RX ADMIN — ACETAMINOPHEN 975 MG: 325 TABLET, FILM COATED ORAL at 08:17

## 2023-07-18 RX ADMIN — HYDROXYZINE HYDROCHLORIDE 10 MG: 10 TABLET ORAL at 23:00

## 2023-07-18 RX ADMIN — CEFAZOLIN SODIUM 2 G: 2 INJECTION, SOLUTION INTRAVENOUS at 04:04

## 2023-07-18 RX ADMIN — ACETAMINOPHEN 975 MG: 325 TABLET, FILM COATED ORAL at 23:22

## 2023-07-18 RX ADMIN — FAMOTIDINE 20 MG: 20 TABLET ORAL at 21:09

## 2023-07-18 RX ADMIN — FLUTICASONE PROPIONATE 2 SPRAY: 50 SPRAY, METERED NASAL at 21:09

## 2023-07-18 RX ADMIN — SIMVASTATIN 10 MG: 10 TABLET, FILM COATED ORAL at 21:09

## 2023-07-18 RX ADMIN — ACETAMINOPHEN 975 MG: 325 TABLET, FILM COATED ORAL at 15:58

## 2023-07-18 RX ADMIN — SENNOSIDES AND DOCUSATE SODIUM 1 TABLET: 50; 8.6 TABLET ORAL at 21:09

## 2023-07-18 RX ADMIN — Medication 50 MCG: at 08:18

## 2023-07-18 RX ADMIN — CEFAZOLIN 1 G: 1 INJECTION, POWDER, FOR SOLUTION INTRAMUSCULAR; INTRAVENOUS at 12:10

## 2023-07-18 RX ADMIN — ASPIRIN 81 MG: 81 TABLET, COATED ORAL at 21:09

## 2023-07-18 RX ADMIN — POLYETHYLENE GLYCOL 3350 17 G: 17 POWDER, FOR SOLUTION ORAL at 08:17

## 2023-07-18 RX ADMIN — FAMOTIDINE 20 MG: 20 TABLET ORAL at 08:18

## 2023-07-18 RX ADMIN — ASPIRIN 81 MG: 81 TABLET, COATED ORAL at 08:17

## 2023-07-18 ASSESSMENT — ACTIVITIES OF DAILY LIVING (ADL)
ADLS_ACUITY_SCORE: 20
ADLS_ACUITY_SCORE: 21
ADLS_ACUITY_SCORE: 20
ADLS_ACUITY_SCORE: 25
ADLS_ACUITY_SCORE: 25
ADLS_ACUITY_SCORE: 21
ADLS_ACUITY_SCORE: 24
ADLS_ACUITY_SCORE: 20
ADLS_ACUITY_SCORE: 20

## 2023-07-18 NOTE — PLAN OF CARE
Date/Time: 7/17/2023, 4525-6504    Diagnosis: L Total knee revision  POD#:0  Mental Status: A/Ox4  Activity/dangle: Ax1 GB/walker  Diet: Regular  Pain: PRN oxy and scheduled tylenol  Torres/Voiding: Adeq in bathroom  Tele/Restraints/Iso: tele NSR  02/LDA: R PIV infusing NS 100ml/hr  D/C Date: Likely 7/19 to TCU before going back home

## 2023-07-18 NOTE — PROGRESS NOTES
Ortho      Stable    Drain out put  Minimal  Hope  Its  Working  Not  A lot  For  A revision.    xrays look  Fine.    Plan  Soc  Service  hiefully  sa her Monday  To begin process of  Getting  A  Tcu bed   Hope are for  Nate    Continue  abx for a second  Day  Awaiting  cx.

## 2023-07-18 NOTE — PROGRESS NOTES
07/17/23 1900   Appointment Info   Signing Clinician's Name / Credentials (PT) Champ Gallego DPT   Rehab Comments (PT) WBAT LLE, ROM as larissa. Pt planning on TCU as she does not have support at home to help after surgery and would be alone, recommend deferring OT services in hospital at this time.   Living Environment   People in Home alone   Current Living Arrangements apartment   Home Accessibility no concerns   Transportation Anticipated family or friend will provide   Living Environment Comments Pt lives alone in apt with elevator access. Will not be able to have 24/7 assist at home   Self-Care   Usual Activity Tolerance moderate   Current Activity Tolerance fair   Equipment Currently Used at Home grab bar, toilet;grab bar, tub/shower;raised toilet seat   Fall history within last six months no   Activity/Exercise/Self-Care Comment At baseline pt was IND with all mobilty and ADL's, not walking far before surgery   General Information   Onset of Illness/Injury or Date of Surgery 07/17/23   Referring Physician Rony Kebede MD   Patient/Family Therapy Goals Statement (PT) discharge to TCU before home   Pertinent History of Current Problem (include personal factors and/or comorbidities that impact the POC) Lary Gonzalez is a 68 year old female with PMH significant for GERD, allergic rhinitis, dyslipidemia, IBS, vitamin D deficiency, and osteopenia who was admitted to [facility] on 7/17/2023 by the orthopedics service for left total knee revision with Dr. Rony Kebede   Existing Precautions/Restrictions fall;weight bearing   Weight-Bearing Status - LLE weight-bearing as tolerated   Cognition   Affect/Mental Status (Cognition) WFL;anxious   Orientation Status (Cognition) oriented x 4   Follows Commands (Cognition) WNL   Pain Assessment   Patient Currently in Pain Yes, see Vital Sign flowsheet  (7/10 left knee pain)   Integumentary/Edema   Integumentary/Edema no deficits were  identifed   Integumentary/Edema Comments left knee covered in ace wrap with hemovac attached, sanguineous drainage present   Posture    Posture Forward head position   Range of Motion (ROM)   Range of Motion ROM deficits secondary to surgical procedure;ROM deficits secondary to pain   ROM Comment deficits with L knee post surgery with AROM ~0-55, otherwise appears grossly WFL   Strength (Manual Muscle Testing)   Strength (Manual Muscle Testing) Able to perform R SLR;Deficits observed during functional mobility   Strength Comments not formally assessed, deficits with L knee post surgery   Bed Mobility   Comment, (Bed Mobility) supine>sit SBA   Transfers   Comment, (Transfers) sit<>stand with FWW CGA   Gait/Stairs (Locomotion)   Island Level (Gait) contact guard   Assistive Device (Gait) walker, front-wheeled   Distance in Feet 5'   Distance in Feet (Gait) 5'   Pattern (Gait) step-through   Deviations/Abnormal Patterns (Gait) left sided deviations;antalgic;charlene decreased;festinating/shuffling;gait speed decreased;stride length decreased;other (see comments);weight shifting decreased   Maintains Weight-bearing Status (Gait) able to maintain   Balance   Balance Comments deficits with dynamic balance   Sensory Examination   Sensory Perception Comments pt reports bilat LE neuropathy at baseline   Clinical Impression   Criteria for Skilled Therapeutic Intervention Yes, treatment indicated   PT Diagnosis (PT) impaired gait   Influenced by the following impairments pain, deficits with ROM, strength, balance   Functional limitations due to impairments decreased ind with bed mobility, transfers, amb, ADL's   Clinical Presentation (PT Evaluation Complexity) Stable/Uncomplicated   Clinical Presentation Rationale PMH and clinical judgement   Clinical Decision Making (Complexity) low complexity   Planned Therapy Interventions (PT) balance training;bed mobility training;cryotherapy;gait training;home exercise  program;patient/family education;ROM (range of motion);stair training;strengthening;stretching;transfer training;progressive activity/exercise   Anticipated Equipment Needs at Discharge (PT)   (pt will provide FWW and SEC)   Risk & Benefits of therapy have been explained evaluation/treatment results reviewed;care plan/treatment goals reviewed;risks/benefits reviewed;current/potential barriers reviewed;participants voiced agreement with care plan;participants included;patient;son   PT Total Evaluation Time   PT Eval, Low Complexity Minutes (50145) 10   Plan of Care Review   Plan of Care Reviewed With patient   Physical Therapy Goals   PT Frequency 2x/day   PT Predicted Duration/Target Date for Goal Attainment 07/19/23   PT Goals Bed Mobility;Transfers;Gait   PT: Bed Mobility Modified independent;Supine to/from sit;Rolling;Bridging   PT: Transfers Supervision/stand-by assist;Sit to/from stand;Assistive device   PT: Gait Supervision/stand-by assist;Rolling walker;50 feet   Interventions   Interventions Quick Adds Gait Training;Therapeutic Activity;Therapeutic Procedure   Therapeutic Procedure/Exercise   Ther. Procedure: strength, endurance, ROM, flexibillity Minutes (59303) 8   Symptoms Noted During/After Treatment increased pain   Treatment Detail/Skilled Intervention Educated on post surgical benefits of TE on circulation, functional ROM, and strength. With pt in supine cued for AP's x 20, on LLE: quad sets x 10, heel slides x 10. Pt anxious about moving needing encouragement   Therapeutic Activity   Therapeutic Activities: dynamic activities to improve functional performance Minutes (71481) 18   Symptoms Noted During/After Treatment Increased pain   Treatment Detail/Skilled Intervention Greeted pt supine in bed with son present. Eval completed. Educated on orders for WBAT, ROM to flexion tolerance. Pt with KI donned upon arrival, doffed for TE and mobility as pt had no orders for KI and wished to try mobility  without it. With HOB elevated supine>sit SBA, cues for sequencing and hand placement with pt moving very slow. Sitting EOB cues for scooting hips closer to EOB and pt able to perform slowly. Sit<>stand with FWW CGA, cues set up, safe walker and hand placement and pt able to demo back well. Stand>sit on raised toilet seat using FWW and CGA, cues for sequencing and hand placement on arm rests. Pt taking extra time for BR use, nursing called to help get pt back to bed and therapist left pt in BR with nurse present in room for assist back to bed. Increased time for line management and room set up   Gait Training   Gait Training Minutes (34257) 5   Symptoms Noted During/After Treatment (Gait Training) fatigue;increased pain   Treatment Detail/Skilled Intervention Pt amb to BR in room with FWW CGA, pt moving very slow taking small shuffle steps and then stopping to rest every few steps, somewhat anxious, cues for step through pattern in possible but pt reverting to shuffled step-to pattern, cues for forward gaze and safe walker placement. Pt had no overt LoB.   PT Discharge Planning   PT Plan review/progress HEP, progress gait distance, progress transfers and bed mobility   PT Discharge Recommendation (DC Rec)   (defer to ortho)   PT Rationale for DC Rec Pt below baseline and was able to walk to BR using FWW and CGA, pt moving very slowly and anxious at times. Pt lives alone in apt and will not have help 24/7 at home and is planning to discharge to TCU which appears a reasonable option based on how the pt is moving tonight as she would be a high fall risk at home at this time if she were alone.   PT Brief overview of current status bed mobility SBA, STS with FWW CGA, amb with FWW CGA   Total Session Time   Timed Code Treatment Minutes 31   Total Session Time (sum of timed and untimed services) 41

## 2023-07-18 NOTE — PLAN OF CARE
Goal Outcome Evaluation:    4764-7259    Dx: L total knee revision   DOA: 7/17/2023  Mental Statues: A&O X4  VS/O2: VSS on 1L nasal cannula   Activity: A1 with walker and gait belt   Diet: Regular   Bowel/bladder: Voiding adequately in bathroom   Skin: Dressing is CDI. Hemovac in place emptied 60mL this shift.   LDAs: IV saline locked  Discharge: Pending

## 2023-07-18 NOTE — PROGRESS NOTES
"ORTHOPEDIC LOWER EXTREMITY PROGRESS NOTE    POD#1  Patient is a 68 year old female who underwent Procedure(s):  Total Left Knee Revision on 2023. Pain is well controlled. Tolerating medication well, no nausea or vomiting.  Voiding in BR.  Planning to discharge to TCU.  Son was on speaker phone during our discussion.    Vitals:   Blood pressure 104/60, pulse 75, temperature 97.9  F (36.6  C), temperature source Oral, resp. rate 14, height 1.575 m (5' 2\"), weight 81.6 kg (180 lb), SpO2 98 %.  Temp (24hrs), Av.7  F (36.5  C), Min:97.4  F (36.3  C), Max:97.9  F (36.6  C)      Drains: hemovac    EXAM   The patient is awake and alert.  Calves are soft and non-tender.   Sensation is intact.  Dorsiflexion and plantar flexion is intact.  Foot warm with nl cap refill.  The incision is covered with ACE wrap.     Labs:   Recent Labs   Lab Test 23  0740   HGB 11.4*     CX: NGTD  ASSESSMENT  S/p L TKA revision   PLAN  1. DVT prophylaxis: aspirin  2. Weight Bearing: WBAT (Weight bearing as tolerated).  3. Anticipated discharge date 1-2 days. Discharge to Skilled Nursing Facility, SW consulted, appreciate assistance.  4. Cont Pain Control Oxycodone and Tylenol.  Script printed and signed for TCU    Loan Coto PA-C  [facility]        "

## 2023-07-18 NOTE — PLAN OF CARE
Occupational Therapy: Orders received. Chart reviewed and discussed with care team, including IP PT.? Chart indicates plans for TCU. All therapy needs are being met with IP PT. Will defer OT to next level of care. Will complete orders.

## 2023-07-18 NOTE — OP NOTE
Procedure Date: 07/17/2023    PREOPERATIVE DIAGNOSIS:  Aseptic loosening, left total knee.    POSTOPERATIVE DIAGNOSIS:  Aseptic loosening, left total knee.    PROCEDURE PERFORMED:  Total revision, left total knee arthroplasty.    SURGEON:  Rony Kebede MD.    ASSISTANT:  GABRIELA Hernádnez OPA-C.    ANESTHESIA:  General.    IMPLANTS:  Revised to cemented DePuy mobile platform.  Size is 80 x 14 mm pegs.  All cemented femoral and tibial components.  26 mm insert, posterior stabilized, and the patellar button was a 35 mm.    OTHER MEDICATIONS:  100 mL of [place] pain cocktail.  Powdered vancomycin, placed intraarticularly at the end of the case.  DVT prophylaxis will be aspirin therapy.    HEMOVAC DRAINS:  One.    RODRIGUEZ CATHETER:  None.    DESCRIPTION OF PROCEDURE:  Risks, options and alternatives were gone over with the patient.  Identification made.  Prepped and draped in usual fashion.  Bone scan was reviewed.  X-rays reviewed.  Went over the timeout appropriately with the team members.  Tourniquet was inflated to 300 after exsanguination.  Followed up the original scar and extended it proximal and distal a little bit for safety reasons.  Opened up the medial retinaculum.  There was copious fluid and typical significant synovitic changes.  Completed a complete synovectomy.  The patella was removed.  It was loose.  Femoral component was loose.  Tibial component loose.  Femoral component, although not loose on the bone scan, was very loose.  We removed everything carefully with osteotomes.  Minimal bone loss to the distal femur.  Cleaned things up.  Freshened the edges.  We began injecting the pain cocktail in the posterior capsule area.    This was done under 1 mm increments under direct vision.  We began cleaning things out there.  Following that, cleaned things out carefully and thoroughly.    Cleaned things up there then removed the components.  Reamed up appropriately to 14 the tibia and the femur, using  Rogers plugs for later cement technique.  Trials were placed in there.  Very soft bone that collapsed in the anterior aspect of the tibial plateau.  We had to correct for that.  Identified the fibular head.  There was just no way to keep the joint line right at that level.    Irrigated out copiously.  Began the process.  Cemented all 3 components in simultaneously.  Appropriate trials were then done.  Appropriate cutting jig was used to place the femoral components.  No notching occurred.  Cemented all components in simultaneously.  Had a batch of extra cement prepared for the patella and cemented that place.  Once it was in, it tracked very well.  Closure was done interrupted retinaculum with 0 Ethibond.  Lateral release had been done in safe eversion exposure.    Cleaned up carefully and thoroughly.  Cleaned things out there.    Deep subcutaneous with #0 Vicryl, 2-0 Vicryl, staples.  Hemovac brought out.  Vancomycin powder had been placed into the joint, 1 gram that is.    TOURNIQUET TIME:  132 minutes at 300.    PLAN:  Routine PT/OT, weight-bear as tolerated, range of motion.  She will be in the hospital a couple to 3 days after complex revision.    Again, the implant was a Voluntisuy plaque mobile platform, long stem tibial and femoral components, 80 mm augments to it.    Rony Kebede MD        D: 2023   T: 2023   MT: [email or web address]    Name:     THOMAS MCRAE  MRN:      [id number]        Account:        [id number]   :      1955           Procedure Date: 2023     Document: [id number]

## 2023-07-18 NOTE — PROVIDER NOTIFICATION
Ortho SHERRI Cates notified of pt skin redness/pink  around her neck and face  after IV antibiotics dose today. Pt asymptomatic and denies itching. Ortho ordered continue  to monitor and may be Hospitalist  consult is symptoms occurs.

## 2023-07-18 NOTE — CONSULTS
Care Management Initial Consult    General Information  Assessment completed with: Patient, VM-chart review, Lary  Type of CM/SW Visit: Initial Assessment    Primary Care Provider verified and updated as needed: Yes   Readmission within the last 30 days:        Reason for Consult: discharge planning  Advance Care Planning:            Communication Assessment  Patient's communication style: spoken language (English or Bilingual)    Hearing Difficulty or Deaf: no   Wear Glasses or Blind: yes    Cognitive  Cognitive/Neuro/Behavioral: WDL  Level of Consciousness: alert  Arousal Level: opens eyes spontaneously  Orientation: oriented x 4  Mood/Behavior: calm, cooperative  Best Language: 0 - No aphasia  Speech: clear, logical    Living Environment:   People in home: alone     Current living Arrangements: apartment      Able to return to prior arrangements: yes       Family/Social Support:  Care provided by: self  Provides care for: no one  Marital Status:   Children          Description of Support System: Supportive    Support Assessment: Adequate family and caregiver support, Adequate social supports    Current Resources:   Patient receiving home care services:       Community Resources:    Equipment currently used at home: grab bar, toilet, grab bar, tub/shower, raised toilet seat  Supplies currently used at home:      Employment/Financial:  Employment Status:          Financial Concerns:             Does the patient's insurance plan have a 3 day qualifying hospital stay waiver?  No    Lifestyle & Psychosocial Needs:  Social Determinants of Health     Tobacco Use: Low Risk  (7/18/2023)    Patient History      Smoking Tobacco Use: Never      Smokeless Tobacco Use: Never      Passive Exposure: Not on file   Alcohol Use: Not on file   Financial Resource Strain: Not on file   Food Insecurity: Not on file   Transportation Needs: Not on file   Physical Activity: Not on file   Stress: Not on file   Social Connections:  Not on file   Intimate Partner Violence: Not on file   Depression: Not on file   Housing Stability: Not on file       Functional Status:  Prior to admission patient needed assistance:              Mental Health Status:          Chemical Dependency Status:                Values/Beliefs:  Spiritual, Cultural Beliefs, [employer] Practices, Values that affect care:                 Additional Information:  Patient admitted on 7/17/23 for failed total left knee. Prior to surgery patient met with [facility] to discuss post surgery plans. Per plan, patient will discharge to TCU. Choices for TCU are [facility], [facility], [facility], [facility] and [facility] and were sent via [facility]. Discussed private room vs shared/semi private room and patient would prefer a shared/semi private room. Discussed  cost of transportation and per plan, patient will transport to TCU via son if able to get into a vehicle.     Writer explained that prior auth will be needed. She stated that a 3 day stay was needed and writer confirmed it is not. Writer spoke to Candi at [facility] who confirmed via her business office that patient does not require a 3 day stay. Candi also noted that there will be a bed available tomorrow, 7/19/23 if patient is ready for discharge. Patient updated.    [employer] auth started via fax to [facility].    Writer confirmed choices with patient and informed that referrals have been sent.

## 2023-07-19 ENCOUNTER — APPOINTMENT (OUTPATIENT)
Dept: PHYSICAL THERAPY | Facility: CLINIC | Age: 68
DRG: 468 | End: 2023-07-19
Attending: ORTHOPAEDIC SURGERY
Payer: COMMERCIAL

## 2023-07-19 VITALS
DIASTOLIC BLOOD PRESSURE: 69 MMHG | RESPIRATION RATE: 16 BRPM | HEIGHT: 62 IN | BODY MASS INDEX: 33.13 KG/M2 | SYSTOLIC BLOOD PRESSURE: 109 MMHG | TEMPERATURE: 98.6 F | HEART RATE: 77 BPM | OXYGEN SATURATION: 98 % | WEIGHT: 180 LBS

## 2023-07-19 LAB
GLUCOSE SERPL-MCNC: 104 MG/DL (ref 70–99)
HGB BLD-MCNC: 10.2 G/DL (ref 11.7–15.7)

## 2023-07-19 PROCEDURE — 36415 COLL VENOUS BLD VENIPUNCTURE: CPT | Performed by: ORTHOPAEDIC SURGERY

## 2023-07-19 PROCEDURE — 250N000013 HC RX MED GY IP 250 OP 250 PS 637: Performed by: ORTHOPAEDIC SURGERY

## 2023-07-19 PROCEDURE — 85018 HEMOGLOBIN: CPT | Performed by: ORTHOPAEDIC SURGERY

## 2023-07-19 PROCEDURE — 97110 THERAPEUTIC EXERCISES: CPT | Mod: GP | Performed by: PHYSICAL THERAPY ASSISTANT

## 2023-07-19 PROCEDURE — 82947 ASSAY GLUCOSE BLOOD QUANT: CPT | Performed by: ORTHOPAEDIC SURGERY

## 2023-07-19 PROCEDURE — 97116 GAIT TRAINING THERAPY: CPT | Mod: GP | Performed by: PHYSICAL THERAPY ASSISTANT

## 2023-07-19 PROCEDURE — 250N000011 HC RX IP 250 OP 636: Performed by: ORTHOPAEDIC SURGERY

## 2023-07-19 PROCEDURE — 97530 THERAPEUTIC ACTIVITIES: CPT | Mod: GP | Performed by: PHYSICAL THERAPY ASSISTANT

## 2023-07-19 RX ORDER — ACETAMINOPHEN 325 MG/1
650 TABLET ORAL EVERY 4 HOURS PRN
Qty: 100 TABLET | Refills: 0 | Status: SHIPPED | OUTPATIENT
Start: 2023-07-19

## 2023-07-19 RX ORDER — ASPIRIN 81 MG/1
81 TABLET ORAL 2 TIMES DAILY
Qty: 60 TABLET | Refills: 0 | Status: SHIPPED | OUTPATIENT
Start: 2023-07-19

## 2023-07-19 RX ORDER — AMOXICILLIN 250 MG
1-2 CAPSULE ORAL 2 TIMES DAILY
Qty: 30 TABLET | Refills: 0 | Status: SHIPPED | OUTPATIENT
Start: 2023-07-19

## 2023-07-19 RX ADMIN — ASPIRIN 81 MG: 81 TABLET, COATED ORAL at 08:32

## 2023-07-19 RX ADMIN — ACETAMINOPHEN 975 MG: 325 TABLET, FILM COATED ORAL at 08:32

## 2023-07-19 RX ADMIN — HYDROXYZINE HYDROCHLORIDE 10 MG: 10 TABLET ORAL at 06:17

## 2023-07-19 RX ADMIN — SENNOSIDES AND DOCUSATE SODIUM 1 TABLET: 50; 8.6 TABLET ORAL at 08:32

## 2023-07-19 RX ADMIN — CEFAZOLIN 1 G: 1 INJECTION, POWDER, FOR SOLUTION INTRAMUSCULAR; INTRAVENOUS at 04:00

## 2023-07-19 RX ADMIN — FAMOTIDINE 20 MG: 20 TABLET ORAL at 08:32

## 2023-07-19 RX ADMIN — OXYCODONE HYDROCHLORIDE 5 MG: 5 TABLET ORAL at 08:42

## 2023-07-19 RX ADMIN — Medication 50 MCG: at 08:32

## 2023-07-19 RX ADMIN — POLYETHYLENE GLYCOL 3350 17 G: 17 POWDER, FOR SOLUTION ORAL at 08:33

## 2023-07-19 ASSESSMENT — ACTIVITIES OF DAILY LIVING (ADL)
ADLS_ACUITY_SCORE: 21

## 2023-07-19 NOTE — PROGRESS NOTES
Care Management Follow Up    Length of Stay (days): 2    Expected Discharge Date: 07/19/2023     Concerns to be Addressed:       Patient plan of care discussed at interdisciplinary rounds: Yes    Anticipated Discharge Disposition: Transitional Care     Anticipated Discharge Services: None  Anticipated Discharge DME: None    Patient/family educated on Medicare website which has current facility and service quality ratings: no  Education Provided on the Discharge Plan:    Patient/Family in Agreement with the Plan: yes    Referrals Placed by CM/SW: Post Acute Facilities  Private pay costs discussed: Not applicable    Additional Information:  Marco called shamika to complete clinicals over the phone. MARCO informed case will be reviewed and MARCO will be called with determination. MARCO will continue to follow.    Addendum: MARCO informed [facility] can accept anytime after 11 today pending insurance auth.    Addendum: MARCO received insurance auth [id number] 07/18/2023-07/22/2023      BETTINA Mcqueen    [facility]

## 2023-07-19 NOTE — PLAN OF CARE
Date/Time7/19 nights      Trauma/Ortho/Medical (Choose one)  ortho      Diagnosis: left knee   POD#:2   Mental Status:a/ox4  Activity/dangle 1/walker  Diet: regular diet   Pain: oxycodone   Torres/Voiding: bathroom   Tele/Restraints/Iso:tele SR  02/LDA:IVF infusing.   D/C Date:TCU PENDING?   Other Info: [facility] in place

## 2023-07-19 NOTE — PLAN OF CARE
Date/Time7/18    Trauma/Ortho/Medical (Choose one)  ortho     Diagnosis: left knee   POD#:1  Mental Status:a/ox4  Activity/dangle 1/walker  Diet: regular diet   Pain: oxycodone   Torres/Voiding: bathroom   Tele/Restraints/Iso:tele SR  02/LDA:IVF infusing.   D/C Date:TCU PENDING?   Other Info:A/OX4, Dreg CDI. On IV antibiotics. Regular diet.  Regular diet. Progressing per plan of care.

## 2023-07-19 NOTE — PROGRESS NOTES
Lary Gonzalez  2023  POD #2    Doing well.  No immediate surgical complications identified.  Pain well-controlled.  Tolerating physical therapy and rehabilitation well.  Cultures  Neg at  End of  Day  one  Temperatures:  Current - Temp: 98.6  F (37  C); Max - Temp  Av.3  F (36.8  C)  Min: 97.8  F (36.6  C)  Max: 99.1  F (37.3  C)  Pulse range: Pulse  Av.8  Min: 71  Max: 79  Blood pressure range: Systolic (24hrs), Av , Min:98 , Max:129   ; Diastolic (24hrs), Av, Min:59, Max:75    CMS: intact  Labs:   Results for orders placed or performed during the hospital encounter of 23 (from the past 24 hour(s))   Hemoglobin   Result Value Ref Range    Hemoglobin 10.2 (L) 11.7 - 15.7 g/dL   Glucose   Result Value Ref Range    Glucose 104 (H) 70 - 99 mg/dL       PLAN:    Discharge plan: Skilled Nursing Facility  Add  Keflex  For  5  Days    Re ck in  Office in about 2 weeks  Will have  Clips  Removed in  Tcu  If  Still there.

## 2023-07-19 NOTE — PROGRESS NOTES
Care Management Discharge Note    Discharge Date: 07/19/2023       Discharge Disposition: Transitional Care    Discharge Services: None    Discharge DME: None    Discharge Transportation: family or friend will provide    Private pay costs discussed:N/A    Does the patient's insurance plan have a 3 day qualifying hospital stay waiver?  Yes   Will the waiver be used for post-acute placement? Yes    PAS Confirmation Code: [id number]  Patient/family educated on Medicare website which has current facility and service quality ratings: no    Education Provided on the Discharge Plan:    Persons Notified of Discharge Plans: Patient  Patient/Family in Agreement with the Plan: yes    Handoff Referral Completed: No    Additional Information:  SW received discharge orders/paper scripts and faxed to [facility]. SW completed PAS and faxed/added to chart. SW received shamika ball. Patient plans on friend transport at 1430 today. Patient in agreement with discharge via friend at 1430 to [facility].         BETTINA Mcqueen    [facility]

## 2023-07-21 LAB — BACTERIA TISS BX CULT: ABNORMAL

## 2023-07-23 ENCOUNTER — DOCUMENTATION ONLY (OUTPATIENT)
Dept: OTHER | Facility: CLINIC | Age: 68
End: 2023-07-23
Payer: COMMERCIAL

## 2023-07-24 LAB — BACTERIA TISS BX CULT: ABNORMAL

## 2023-07-25 NOTE — DISCHARGE SUMMARY
Admit Date: 2023  Discharge Date: 2023    The patient was admitted for revision total knee arthroplasty.  At the time of discharge, cultures were negative. As of this date, positive for only.  The concern is probably contaminant.  We will call in some oral antibiotics to the nursing home.    Wound was healing fine at time of discharge.  No complications.  Postoperative x-rays looked good.  To be kept on aspirin therapy for DVT prophylaxis.  The patient will probably go back to the [facility] outpatient therapy when she is done there.  She will resume all prehospital care plans, diets, meds, etc.     DIAGNOSIS: Total knee arthroplasty revision on her left knee.    No problems there.  She transferred awake and .  She was discharged and transferred to the TCU postoperative day #2.    Rony Kebede MD        D: 2023   T: 2023   MT: [email or web address]    Name:     THOMAS MCRAE  MRN:      [id number]        Account:      [id number]   :      1955           Service Date: 2023                                  Discharge Date: 2023     Document: [id number]

## 2024-03-11 ENCOUNTER — MEDICAL CORRESPONDENCE (OUTPATIENT)
Dept: SCHEDULING | Facility: CLINIC | Age: 69
End: 2024-03-11
Payer: COMMERCIAL

## 2024-03-20 ENCOUNTER — DOCUMENTATION ONLY (OUTPATIENT)
Dept: OTHER | Facility: CLINIC | Age: 69
End: 2024-03-20
Payer: COMMERCIAL

## 2024-03-20 ENCOUNTER — HOSPITAL ENCOUNTER (OUTPATIENT)
Dept: BONE DENSITY | Facility: CLINIC | Age: 69
Discharge: HOME | End: 2024-03-20
Attending: SPECIALIST | Admitting: SPECIALIST
Payer: COMMERCIAL

## 2024-03-20 DIAGNOSIS — M81.0 OSTEOPOROSIS: ICD-10-CM

## 2024-03-20 PROCEDURE — 77080 DXA BONE DENSITY AXIAL: CPT

## 2025-03-25 ENCOUNTER — TRANSFERRED RECORDS (OUTPATIENT)
Dept: HEALTH INFORMATION MANAGEMENT | Facility: CLINIC | Age: 70
End: 2025-03-25

## 2025-03-25 LAB — CREATININE (EXTERNAL): 0.72 MG/DL (ref 0.52–1.04)

## 2025-03-26 ENCOUNTER — MEDICAL CORRESPONDENCE (OUTPATIENT)
Dept: HEALTH INFORMATION MANAGEMENT | Facility: CLINIC | Age: 70
End: 2025-03-26

## 2025-04-24 DIAGNOSIS — M81.0 SENILE OSTEOPOROSIS: Primary | ICD-10-CM

## 2025-04-24 RX ORDER — HEPARIN SODIUM,PORCINE 10 UNIT/ML
5-20 VIAL (ML) INTRAVENOUS DAILY PRN
OUTPATIENT
Start: 2025-04-24

## 2025-04-24 RX ORDER — DIPHENHYDRAMINE HYDROCHLORIDE 50 MG/ML
25 INJECTION, SOLUTION INTRAMUSCULAR; INTRAVENOUS
Start: 2025-04-24

## 2025-04-24 RX ORDER — ALBUTEROL SULFATE 0.83 MG/ML
2.5 SOLUTION RESPIRATORY (INHALATION)
OUTPATIENT
Start: 2025-04-24

## 2025-04-24 RX ORDER — HEPARIN SODIUM (PORCINE) LOCK FLUSH IV SOLN 100 UNIT/ML 100 UNIT/ML
5 SOLUTION INTRAVENOUS
OUTPATIENT
Start: 2025-04-24

## 2025-04-24 RX ORDER — ZOLEDRONIC ACID 0.05 MG/ML
5 INJECTION, SOLUTION INTRAVENOUS ONCE
Start: 2025-04-24

## 2025-04-24 RX ORDER — ALBUTEROL SULFATE 90 UG/1
1-2 INHALANT RESPIRATORY (INHALATION)
Start: 2025-04-24

## 2025-04-24 RX ORDER — MEPERIDINE HYDROCHLORIDE 25 MG/ML
25 INJECTION INTRAMUSCULAR; INTRAVENOUS; SUBCUTANEOUS
OUTPATIENT
Start: 2025-04-24

## 2025-04-24 RX ORDER — METHYLPREDNISOLONE SODIUM SUCCINATE 40 MG/ML
40 INJECTION INTRAMUSCULAR; INTRAVENOUS
Start: 2025-04-24

## 2025-04-24 RX ORDER — DIPHENHYDRAMINE HYDROCHLORIDE 50 MG/ML
50 INJECTION, SOLUTION INTRAMUSCULAR; INTRAVENOUS
Start: 2025-04-24

## 2025-04-24 RX ORDER — EPINEPHRINE 1 MG/ML
0.3 INJECTION, SOLUTION INTRAMUSCULAR; SUBCUTANEOUS EVERY 5 MIN PRN
OUTPATIENT
Start: 2025-04-24

## 2025-05-06 ENCOUNTER — TELEPHONE (OUTPATIENT)
Dept: ONCOLOGY | Facility: CLINIC | Age: 70
End: 2025-05-06
Payer: COMMERCIAL

## 2025-05-06 NOTE — TELEPHONE ENCOUNTER
Pt is calling. States that she is scheduled for reclast infusion tomorrow and just wanted to make sure that everything is good to go for her appt tomorrow. She was also thinking that there was something she should be doing to get ready for the appt tomorrow?  Writer checked with charge nurse Jazzy, who states that labs have been done and pt is good to go for the appt tomorrow. Pt should be taking calcium and vitamin D supplement daily.    Pt states that she has been taking calcium and vitamin D every day.   Appt time was reviewed with pt.  Patient verbalized understanding and agreement with plan.  Mary Molina RN on 5/6/2025 at 10:52 AM

## 2025-05-07 ENCOUNTER — INFUSION THERAPY VISIT (OUTPATIENT)
Dept: INFUSION THERAPY | Facility: CLINIC | Age: 70
End: 2025-05-07
Attending: SPECIALIST
Payer: COMMERCIAL

## 2025-05-07 VITALS
BODY MASS INDEX: 31.36 KG/M2 | HEART RATE: 76 BPM | SYSTOLIC BLOOD PRESSURE: 122 MMHG | HEIGHT: 63 IN | TEMPERATURE: 98.6 F | DIASTOLIC BLOOD PRESSURE: 81 MMHG | OXYGEN SATURATION: 96 % | WEIGHT: 177 LBS | RESPIRATION RATE: 16 BRPM

## 2025-05-07 DIAGNOSIS — M81.0 SENILE OSTEOPOROSIS: Primary | ICD-10-CM

## 2025-05-07 PROCEDURE — 258N000003 HC RX IP 258 OP 636: Performed by: SPECIALIST

## 2025-05-07 PROCEDURE — 96365 THER/PROPH/DIAG IV INF INIT: CPT

## 2025-05-07 PROCEDURE — 250N000011 HC RX IP 250 OP 636: Mod: JZ | Performed by: SPECIALIST

## 2025-05-07 RX ORDER — ZOLEDRONIC ACID 0.05 MG/ML
5 INJECTION, SOLUTION INTRAVENOUS ONCE
Status: COMPLETED | OUTPATIENT
Start: 2025-05-07 | End: 2025-05-07

## 2025-05-07 RX ORDER — DIPHENHYDRAMINE HYDROCHLORIDE 50 MG/ML
25 INJECTION, SOLUTION INTRAMUSCULAR; INTRAVENOUS
Start: 2026-05-07

## 2025-05-07 RX ORDER — DIPHENHYDRAMINE HYDROCHLORIDE 50 MG/ML
50 INJECTION, SOLUTION INTRAMUSCULAR; INTRAVENOUS
Start: 2026-05-07

## 2025-05-07 RX ORDER — HEPARIN SODIUM (PORCINE) LOCK FLUSH IV SOLN 100 UNIT/ML 100 UNIT/ML
5 SOLUTION INTRAVENOUS
OUTPATIENT
Start: 2026-05-07

## 2025-05-07 RX ORDER — METHYLPREDNISOLONE SODIUM SUCCINATE 40 MG/ML
40 INJECTION INTRAMUSCULAR; INTRAVENOUS
Start: 2026-05-07

## 2025-05-07 RX ORDER — EPINEPHRINE 1 MG/ML
0.3 INJECTION, SOLUTION INTRAMUSCULAR; SUBCUTANEOUS EVERY 5 MIN PRN
OUTPATIENT
Start: 2026-05-07

## 2025-05-07 RX ORDER — MEPERIDINE HYDROCHLORIDE 25 MG/ML
25 INJECTION INTRAMUSCULAR; INTRAVENOUS; SUBCUTANEOUS
OUTPATIENT
Start: 2026-05-07

## 2025-05-07 RX ORDER — ALBUTEROL SULFATE 0.83 MG/ML
2.5 SOLUTION RESPIRATORY (INHALATION)
OUTPATIENT
Start: 2026-05-07

## 2025-05-07 RX ORDER — HEPARIN SODIUM,PORCINE 10 UNIT/ML
5-20 VIAL (ML) INTRAVENOUS DAILY PRN
OUTPATIENT
Start: 2026-05-07

## 2025-05-07 RX ORDER — ZOLEDRONIC ACID 0.05 MG/ML
5 INJECTION, SOLUTION INTRAVENOUS ONCE
Status: CANCELLED
Start: 2026-05-07

## 2025-05-07 RX ORDER — ALBUTEROL SULFATE 90 UG/1
1-2 INHALANT RESPIRATORY (INHALATION)
Start: 2026-05-07

## 2025-05-07 RX ADMIN — SODIUM CHLORIDE 250 ML: 0.9 INJECTION, SOLUTION INTRAVENOUS at 14:35

## 2025-05-07 RX ADMIN — ZOLEDRONIC ACID 5 MG: 0.05 INJECTION, SOLUTION INTRAVENOUS at 14:39

## 2025-05-07 NOTE — PROGRESS NOTES
Infusion Nursing Note:  Lary Gonzalez presents today for Reclast.    Patient seen by provider today: No   present during visit today: Not Applicable.    Note: Pt reports she has had Reclast 2 times in the past. She reports she had tolerated the previous Reclast infusions well, ex some flu like symptoms, which were transient. Pt reports no new health changes or new concerns. Pt states that she takes Ca+Vit D as prescribed.       Intravenous Access:  Peripheral IV placed.    Treatment Conditions:  Creat:0.72  Ca:9.6      Post Infusion Assessment:  Patient tolerated infusion without incident.  Blood return noted pre and post infusion.  Site patent and intact, free from redness, edema or discomfort.  No evidence of extravasations.  Access discontinued per protocol.       Discharge Plan:   Discharge instructions reviewed with: Patient.  Patient and/or family verbalized understanding of discharge instructions and all questions answered.  AVS to patient via [employer].  Patient will return as advised by her provider for next appointment.   Patient discharged in stable condition accompanied by: self.  Departure Mode: Ambulatory.      Carlie Heath RN

## (undated) DEVICE — BLADE SAW SAGITTAL STRK 13X90X1.27MM HD SYS 6 6113-127-090

## (undated) DEVICE — BLADE SAW SAGITTAL STRK 25X90X1.37MM 4H SYS 6 6125-137-090

## (undated) DEVICE — SU ETHIBOND 1 CTX 30" X865H

## (undated) DEVICE — SU ETHIBOND 0 CTX CR  8X18" CX31D

## (undated) DEVICE — SU VICRYL 2-0 CP-1 27" J466H

## (undated) DEVICE — SOL WATER IRRIG 1000ML BOTTLE 2F7114

## (undated) DEVICE — SUCTION IRR SYSTEM W/O TIP INTERPULSE HANDPIECE 0210-100-000

## (undated) DEVICE — SOL NACL 0.9% INJ 1000ML BAG 2B1324X

## (undated) DEVICE — DRSG XEROFORM 5X9" 8884431605

## (undated) DEVICE — PACK TOTAL KNEE SOP15TKFSD

## (undated) DEVICE — GLOVE BIOGEL PI MICRO INDICATOR UNDERGLOVE SZ 8.0 48980

## (undated) DEVICE — LINEN TOWEL PACK X5 5464

## (undated) DEVICE — SU VICRYL 2-0 CP-1 27" UND J266H

## (undated) DEVICE — SU VICRYL 0 CP-1 27" J467H

## (undated) DEVICE — MANIFOLD NEPTUNE 4 PORT 700-20

## (undated) DEVICE — SYR 10ML FINGER CONTROL W/O NDL 309695

## (undated) DEVICE — NDL 22GA 1.5"

## (undated) DEVICE — GLOVE BIOGEL PI SZ 8.0 40880

## (undated) DEVICE — BONE CEMENT MIXEVAC III HI VAC KIT  0206-015-000

## (undated) DEVICE — DRAPE SLEEVE 599

## (undated) DEVICE — SYR 30ML SLIP TIP W/O NDL

## (undated) DEVICE — BONE CLEANING TIP INTERPULSE  0210-010-000

## (undated) DEVICE — ESU GROUND PAD UNIVERSAL W/O CORD

## (undated) DEVICE — SU PROLENE 0 CT 30" 8434H

## (undated) DEVICE — BLADE SAW RECIP STRK 70X12.5X1.2MM 0277-096-281

## (undated) DEVICE — DECANTER BAG 2002S

## (undated) DEVICE — SOL NACL 0.9% IRRIG 1000ML BOTTLE 2F7124

## (undated) RX ORDER — DEXAMETHASONE SODIUM PHOSPHATE 4 MG/ML
INJECTION, SOLUTION INTRA-ARTICULAR; INTRALESIONAL; INTRAMUSCULAR; INTRAVENOUS; SOFT TISSUE
Status: DISPENSED
Start: 2023-07-17

## (undated) RX ORDER — FENTANYL CITRATE 50 UG/ML
INJECTION, SOLUTION INTRAMUSCULAR; INTRAVENOUS
Status: DISPENSED
Start: 2023-07-17

## (undated) RX ORDER — EPHEDRINE SULFATE 50 MG/ML
INJECTION, SOLUTION INTRAMUSCULAR; INTRAVENOUS; SUBCUTANEOUS
Status: DISPENSED
Start: 2023-07-17

## (undated) RX ORDER — TRANEXAMIC ACID 650 MG/1
TABLET ORAL
Status: DISPENSED
Start: 2023-07-17

## (undated) RX ORDER — CEFAZOLIN SODIUM/WATER 2 G/20 ML
SYRINGE (ML) INTRAVENOUS
Status: DISPENSED
Start: 2023-07-17

## (undated) RX ORDER — FENTANYL CITRATE 0.05 MG/ML
INJECTION, SOLUTION INTRAMUSCULAR; INTRAVENOUS
Status: DISPENSED
Start: 2023-07-17

## (undated) RX ORDER — VANCOMYCIN HYDROCHLORIDE 1 G/20ML
INJECTION, POWDER, LYOPHILIZED, FOR SOLUTION INTRAVENOUS
Status: DISPENSED
Start: 2023-07-17

## (undated) RX ORDER — GLYCOPYRROLATE 0.2 MG/ML
INJECTION, SOLUTION INTRAMUSCULAR; INTRAVENOUS
Status: DISPENSED
Start: 2023-07-17

## (undated) RX ORDER — ONDANSETRON 2 MG/ML
INJECTION INTRAMUSCULAR; INTRAVENOUS
Status: DISPENSED
Start: 2023-07-17